# Patient Record
Sex: FEMALE | Race: WHITE | Employment: FULL TIME | URBAN - METROPOLITAN AREA
[De-identification: names, ages, dates, MRNs, and addresses within clinical notes are randomized per-mention and may not be internally consistent; named-entity substitution may affect disease eponyms.]

---

## 2017-04-19 ENCOUNTER — TELEPHONE (OUTPATIENT)
Dept: INTERNAL MEDICINE CLINIC | Facility: CLINIC | Age: 22
End: 2017-04-19

## 2017-04-19 NOTE — TELEPHONE ENCOUNTER
Pt is due for [facility] [place] [facility] wellness exam anytime this calendar year. Left message to call back.

## 2017-05-10 ENCOUNTER — TELEPHONE (OUTPATIENT)
Dept: INTERNAL MEDICINE CLINIC | Facility: CLINIC | Age: 22
End: 2017-05-10

## 2017-05-10 NOTE — TELEPHONE ENCOUNTER
Pt is due for [facility] [place] [facility] wellness exam anytime this calendar year. Left message to call back [phone].

## 2017-07-17 ENCOUNTER — TELEPHONE (OUTPATIENT)
Dept: FAMILY MEDICINE CLINIC | Facility: CLINIC | Age: 22
End: 2017-07-17

## 2017-07-17 NOTE — TELEPHONE ENCOUNTER
Pt is due for [facility]CHI[facility] [place] [facility] wellness exam anytime this calendar year. Left message to call back [phone].

## 2018-07-09 ENCOUNTER — OFFICE VISIT (OUTPATIENT)
Dept: OBGYN CLINIC | Facility: CLINIC | Age: 23
End: 2018-07-09

## 2018-07-09 VITALS
SYSTOLIC BLOOD PRESSURE: 103 MMHG | DIASTOLIC BLOOD PRESSURE: 71 MMHG | BODY MASS INDEX: 26 KG/M2 | HEART RATE: 84 BPM | WEIGHT: 154.19 LBS

## 2018-07-09 DIAGNOSIS — Z01.419 WOMEN'S ANNUAL ROUTINE GYNECOLOGICAL EXAMINATION: Primary | ICD-10-CM

## 2018-07-09 DIAGNOSIS — Z23 NEED FOR VACCINATION: ICD-10-CM

## 2018-07-09 DIAGNOSIS — Z11.3 ROUTINE SCREENING FOR STI (SEXUALLY TRANSMITTED INFECTION): ICD-10-CM

## 2018-07-09 DIAGNOSIS — E04.1 RIGHT THYROID NODULE: ICD-10-CM

## 2018-07-09 DIAGNOSIS — N89.8 VAGINAL ITCHING: ICD-10-CM

## 2018-07-09 PROCEDURE — 90651 9VHPV VACCINE 2/3 DOSE IM: CPT | Performed by: ADVANCED PRACTICE MIDWIFE

## 2018-07-09 PROCEDURE — 90471 IMMUNIZATION ADMIN: CPT | Performed by: ADVANCED PRACTICE MIDWIFE

## 2018-07-09 PROCEDURE — 99385 PREV VISIT NEW AGE 18-39: CPT | Performed by: ADVANCED PRACTICE MIDWIFE

## 2018-07-09 PROCEDURE — 99212 OFFICE O/P EST SF 10 MIN: CPT | Performed by: ADVANCED PRACTICE MIDWIFE

## 2018-07-09 NOTE — PROGRESS NOTES
HPI:    Patient ID: Mireya Aguilar is a 24year old female. Here for an annual exam.  Is thinking about making a change in contraception. Wants something non-hormonal.  Had a little vaginal itching for 2 days last week.   Works as a hair and make-up art Constitutional: She is oriented to person, place, and time. She appears well-developed and well-nourished. No distress. HENT:   Head: Normocephalic and atraumatic.    Right Ear: External ear normal.   Left Ear: External ear normal.   Nose: Nose normal. note and vitals reviewed. ASSESSMENT/PLAN:   Need for vaccination  Vaginal itching  Women's annual routine gynecological examination  (primary encounter diagnosis)  Routine screening for sti (sexually transmitted infection)    1.   Discussed die

## 2018-07-10 ENCOUNTER — TELEPHONE (OUTPATIENT)
Dept: OBGYN CLINIC | Facility: CLINIC | Age: 23
End: 2018-07-10

## 2018-07-10 NOTE — TELEPHONE ENCOUNTER
----- Message from ABIDA Leija sent at 7/10/2018 10:39 AM CDT -----  Has a BV infection needs Flagyl 500 mg BID x 7 days no alcohol for 10 days

## 2018-07-11 LAB
GENITAL VAGINOSIS SCREEN: POSITIVE
TRICHOMONAS SCREEN: NEGATIVE

## 2018-07-12 ENCOUNTER — TELEPHONE (OUTPATIENT)
Dept: OBGYN CLINIC | Facility: CLINIC | Age: 23
End: 2018-07-12

## 2018-07-12 NOTE — TELEPHONE ENCOUNTER
[phone] ([place])   Pt informed of vaginal screening results, Flagyl tx and no drinking for 10 days. Verbalized understanding. No further question.

## 2018-07-12 NOTE — TELEPHONE ENCOUNTER
----- Message from ABIDA Han sent at 7/11/2018  3:05 PM CDT -----  Vaginal culture negative for yeast

## 2018-08-21 ENCOUNTER — TELEPHONE (OUTPATIENT)
Dept: OBGYN CLINIC | Facility: CLINIC | Age: 23
End: 2018-08-21

## 2018-08-23 NOTE — TELEPHONE ENCOUNTER
Message left on pt's voicemail that she still has a overdue TSH from July. Pt can call [phone] with any questions or concerns.

## 2018-09-24 ENCOUNTER — TELEPHONE (OUTPATIENT)
Dept: OBGYN CLINIC | Facility: CLINIC | Age: 23
End: 2018-09-24

## 2020-02-10 ENCOUNTER — OFFICE VISIT (OUTPATIENT)
Dept: FAMILY MEDICINE CLINIC | Facility: CLINIC | Age: 25
End: 2020-02-10
Payer: COMMERCIAL

## 2020-02-10 VITALS
WEIGHT: 165 LBS | OXYGEN SATURATION: 99 % | BODY MASS INDEX: 29.23 KG/M2 | TEMPERATURE: 102 F | HEIGHT: 63 IN | RESPIRATION RATE: 18 BRPM | DIASTOLIC BLOOD PRESSURE: 68 MMHG | HEART RATE: 103 BPM | SYSTOLIC BLOOD PRESSURE: 112 MMHG

## 2020-02-10 DIAGNOSIS — J10.1 INFLUENZA A: Primary | ICD-10-CM

## 2020-02-10 DIAGNOSIS — R68.89 FLU-LIKE SYMPTOMS: ICD-10-CM

## 2020-02-10 LAB
POCT INFLUENZA A: POSITIVE
POCT INFLUENZA B: NEGATIVE

## 2020-02-10 PROCEDURE — 99213 OFFICE O/P EST LOW 20 MIN: CPT | Performed by: PHYSICIAN ASSISTANT

## 2020-02-10 PROCEDURE — 87502 INFLUENZA DNA AMP PROBE: CPT | Performed by: PHYSICIAN ASSISTANT

## 2020-02-10 NOTE — PROGRESS NOTES
CHIEF COMPLAINT:     Patient presents with:  Cough: dry cough, body ahces and chills x  1 dy travled back from [place]       HPI:   Ryan Rae is a 31year old female who presents for cold symptoms for since last night .  Had direct flight from sinuses, no frontal sinus tenderness  EYES: conjunctiva clear, EOM intact  EARS: TM's no erythema, bulging, or retraction bilaterally, no fluid, bony landmarks intact  NOSE: nostrils patent, turbinates with mild swelling, clear nasal mucous, nasal mucosa p symptoms worsen or persist within 2-3 days as discussed, follow up if worsening symptoms, uncontrolled fevers, cough, etc, patient understands. Patient understands and agrees with plan.      Malinda Glass PA-C  2/10/2020  1:41 PM

## 2021-03-01 ENCOUNTER — OFFICE VISIT (OUTPATIENT)
Dept: OBGYN CLINIC | Facility: CLINIC | Age: 26
End: 2021-03-01
Payer: COMMERCIAL

## 2021-03-01 VITALS — BODY MASS INDEX: 33 KG/M2 | WEIGHT: 184 LBS

## 2021-03-01 DIAGNOSIS — R10.32 ABDOMINAL PAIN, LEFT LOWER QUADRANT: ICD-10-CM

## 2021-03-01 DIAGNOSIS — Z01.419 WOMEN'S ANNUAL ROUTINE GYNECOLOGICAL EXAMINATION: Primary | ICD-10-CM

## 2021-03-01 DIAGNOSIS — Z11.3 SCREENING FOR STDS (SEXUALLY TRANSMITTED DISEASES): ICD-10-CM

## 2021-03-01 LAB
MULTISTIX LOT#: 5077 NUMERIC
PH, URINE: 5.5 (ref 4.5–8)
SPECIFIC GRAVITY: 1.02 (ref 1–1.03)
URINE-COLOR: YELLOW
UROBILINOGEN,SEMI-QN: 0.2 MG/DL (ref 0–1.9)

## 2021-03-01 PROCEDURE — 99395 PREV VISIT EST AGE 18-39: CPT | Performed by: ADVANCED PRACTICE MIDWIFE

## 2021-03-01 PROCEDURE — 81002 URINALYSIS NONAUTO W/O SCOPE: CPT | Performed by: ADVANCED PRACTICE MIDWIFE

## 2021-03-01 NOTE — PROGRESS NOTES
HPI:    Patient ID: Mildred Burt is a 31year old female. Here for annual exam.  Is a . Is sexually active. Is in a mutual monogamous relationship. Is using condoms for contraception.   Has not seen a provider for any reason in t • Heart Disorder Neg       Social History:   Social History    Tobacco Use      Smoking status: Former Smoker      Smokeless tobacco: Former User    Alcohol use:  Yes      Alcohol/week: 0.0 standard drinks    Drug use: No      Exercise: minimal.  Diet: do General: No tenderness or edema. Lymphadenopathy:     She has no cervical adenopathy. Right: No inguinal adenopathy present. Left: No inguinal adenopathy present. Neurological: She is alert and oriented to person, place, and time.    Skin:

## 2021-03-02 LAB
C TRACH DNA SPEC QL NAA+PROBE: NEGATIVE
N GONORRHOEA DNA SPEC QL NAA+PROBE: NEGATIVE

## 2021-03-04 LAB — LAST PAP RESULT: NORMAL

## 2021-03-16 ENCOUNTER — OFFICE VISIT (OUTPATIENT)
Dept: FAMILY MEDICINE CLINIC | Facility: CLINIC | Age: 26
End: 2021-03-16
Payer: COMMERCIAL

## 2021-03-16 VITALS
HEIGHT: 63 IN | SYSTOLIC BLOOD PRESSURE: 112 MMHG | WEIGHT: 187 LBS | BODY MASS INDEX: 33.13 KG/M2 | HEART RATE: 68 BPM | DIASTOLIC BLOOD PRESSURE: 79 MMHG

## 2021-03-16 DIAGNOSIS — S29.011A MUSCLE STRAIN OF CHEST WALL, INITIAL ENCOUNTER: ICD-10-CM

## 2021-03-16 DIAGNOSIS — Z00.00 WELL ADULT EXAM: Primary | ICD-10-CM

## 2021-03-16 PROCEDURE — 3008F BODY MASS INDEX DOCD: CPT | Performed by: NURSE PRACTITIONER

## 2021-03-16 PROCEDURE — 3074F SYST BP LT 130 MM HG: CPT | Performed by: NURSE PRACTITIONER

## 2021-03-16 PROCEDURE — 99203 OFFICE O/P NEW LOW 30 MIN: CPT | Performed by: NURSE PRACTITIONER

## 2021-03-16 PROCEDURE — 3078F DIAST BP <80 MM HG: CPT | Performed by: NURSE PRACTITIONER

## 2021-03-16 NOTE — ASSESSMENT & PLAN NOTE
Ice 20 min 4-6 times per day  ibuprofen 400 mg I po qid prn w food  Please call if symptoms worsen or are not resolving.

## 2021-03-16 NOTE — PROGRESS NOTES
HPI  Pt here for possible swollen lymph node to left mid rib cage. Symptoms started about a month ago-saw Seng Elizabeth for physical and advised to follow up if symptoms not improving. Tender at times-tries not to touch it. No known injury to area.  Rebekah Ellington Expenses:   Food Insecurity:       Worried About [employer] [employer] in the Last Year:       Ran Out of Food in the Last Year:   Transportation Needs:       Lack of Transportation (Medical):       Lack of Transportation (Non-Medical):   Physical Activity:

## 2021-04-13 ENCOUNTER — IMMUNIZATION (OUTPATIENT)
Dept: LAB | Facility: HOSPITAL | Age: 26
End: 2021-04-13
Attending: HOSPITALIST
Payer: COMMERCIAL

## 2021-04-13 DIAGNOSIS — Z23 NEED FOR VACCINATION: Primary | ICD-10-CM

## 2021-04-13 PROCEDURE — 0011A SARSCOV2 VAC 100MCG/0.5ML IM: CPT

## 2021-05-11 ENCOUNTER — IMMUNIZATION (OUTPATIENT)
Dept: LAB | Facility: HOSPITAL | Age: 26
End: 2021-05-11
Attending: EMERGENCY MEDICINE
Payer: COMMERCIAL

## 2021-05-11 DIAGNOSIS — Z23 NEED FOR VACCINATION: Primary | ICD-10-CM

## 2021-05-11 PROCEDURE — 0012A SARSCOV2 VAC 100MCG/0.5ML IM: CPT

## 2021-08-30 ENCOUNTER — OFFICE VISIT (OUTPATIENT)
Dept: INTERNAL MEDICINE CLINIC | Facility: CLINIC | Age: 26
End: 2021-08-30
Payer: COMMERCIAL

## 2021-08-30 VITALS
DIASTOLIC BLOOD PRESSURE: 76 MMHG | SYSTOLIC BLOOD PRESSURE: 113 MMHG | WEIGHT: 187 LBS | HEART RATE: 82 BPM | BODY MASS INDEX: 33.13 KG/M2 | HEIGHT: 63 IN

## 2021-08-30 DIAGNOSIS — F32.0 CURRENT MILD EPISODE OF MAJOR DEPRESSIVE DISORDER WITHOUT PRIOR EPISODE (HCC): ICD-10-CM

## 2021-08-30 DIAGNOSIS — Z00.00 PHYSICAL EXAM, ANNUAL: Primary | ICD-10-CM

## 2021-08-30 PROCEDURE — 3008F BODY MASS INDEX DOCD: CPT | Performed by: INTERNAL MEDICINE

## 2021-08-30 PROCEDURE — 99395 PREV VISIT EST AGE 18-39: CPT | Performed by: INTERNAL MEDICINE

## 2021-08-30 PROCEDURE — 3074F SYST BP LT 130 MM HG: CPT | Performed by: INTERNAL MEDICINE

## 2021-08-30 PROCEDURE — 3078F DIAST BP <80 MM HG: CPT | Performed by: INTERNAL MEDICINE

## 2021-08-30 NOTE — PROGRESS NOTES
HPI/Subjective:   Patient ID: Kenneth Bui is a 31year old female. Presents for physical exam    HPI  Patient reports that menstrual cycle at times irregular, can come 2 weeks later but more or less gets menstrual bleeding monthly.   During sae ill-appearing. HENT:      Head: Normocephalic and atraumatic. Eyes:      General: No scleral icterus. Extraocular Movements: Extraocular movements intact.       Conjunctiva/sclera: Conjunctivae normal.      Pupils: Pupils are equal, round, and react encounter       Imaging & Referrals:  None

## 2021-11-08 ENCOUNTER — TELEPHONE (OUTPATIENT)
Dept: OBGYN CLINIC | Facility: CLINIC | Age: 26
End: 2021-11-08

## 2021-11-08 ENCOUNTER — LAB ENCOUNTER (OUTPATIENT)
Dept: LAB | Facility: HOSPITAL | Age: 26
End: 2021-11-08
Attending: ADVANCED PRACTICE MIDWIFE
Payer: COMMERCIAL

## 2021-11-08 DIAGNOSIS — R45.86 MOOD CHANGES: ICD-10-CM

## 2021-11-08 DIAGNOSIS — Z13.21 ENCOUNTER FOR VITAMIN DEFICIENCY SCREENING: ICD-10-CM

## 2021-11-08 DIAGNOSIS — E04.9 THYROID ENLARGEMENT: ICD-10-CM

## 2021-11-08 PROCEDURE — 82306 VITAMIN D 25 HYDROXY: CPT

## 2021-11-08 PROCEDURE — 36415 COLL VENOUS BLD VENIPUNCTURE: CPT

## 2021-11-08 PROCEDURE — 84443 ASSAY THYROID STIM HORMONE: CPT

## 2021-11-08 RX ORDER — CHOLECALCIFEROL (VITAMIN D3) 1250 MCG
1 CAPSULE ORAL WEEKLY
Qty: 8 CAPSULE | Refills: 0 | Status: SHIPPED | OUTPATIENT
Start: 2021-11-08 | End: 2022-01-06

## 2021-11-08 NOTE — PROGRESS NOTES
Subjective:   Patient ID: [place] Arellano is a 31year old female. Here because she feels she could had a hormonal issue. Was seen for an annual gyne exam in March 2021. Saw Ankit Platt and Dr. Luli Ta. Multiple labs were ordered.   Was not v Prescriptions      No prescriptions requested or ordered in this encounter       Imaging & Referrals:  None

## 2021-11-23 ENCOUNTER — OFFICE VISIT (OUTPATIENT)
Dept: FAMILY MEDICINE CLINIC | Facility: CLINIC | Age: 26
End: 2021-11-23
Payer: COMMERCIAL

## 2021-11-23 ENCOUNTER — LAB ENCOUNTER (OUTPATIENT)
Dept: LAB | Facility: HOSPITAL | Age: 26
End: 2021-11-23
Attending: INTERNAL MEDICINE
Payer: COMMERCIAL

## 2021-11-23 VITALS
HEIGHT: 63 IN | DIASTOLIC BLOOD PRESSURE: 73 MMHG | WEIGHT: 190 LBS | BODY MASS INDEX: 33.66 KG/M2 | TEMPERATURE: 98 F | SYSTOLIC BLOOD PRESSURE: 111 MMHG | HEART RATE: 73 BPM

## 2021-11-23 DIAGNOSIS — Z00.00 WELL ADULT EXAM: ICD-10-CM

## 2021-11-23 DIAGNOSIS — F43.9 STRESS: ICD-10-CM

## 2021-11-23 DIAGNOSIS — Z00.00 PHYSICAL EXAM, ANNUAL: ICD-10-CM

## 2021-11-23 DIAGNOSIS — S29.011A INTERCOSTAL MUSCLE STRAIN, INITIAL ENCOUNTER: Primary | ICD-10-CM

## 2021-11-23 PROCEDURE — 3078F DIAST BP <80 MM HG: CPT | Performed by: FAMILY MEDICINE

## 2021-11-23 PROCEDURE — 3074F SYST BP LT 130 MM HG: CPT | Performed by: FAMILY MEDICINE

## 2021-11-23 PROCEDURE — 36415 COLL VENOUS BLD VENIPUNCTURE: CPT

## 2021-11-23 PROCEDURE — 82306 VITAMIN D 25 HYDROXY: CPT

## 2021-11-23 PROCEDURE — 85025 COMPLETE CBC W/AUTO DIFF WBC: CPT

## 2021-11-23 PROCEDURE — 80053 COMPREHEN METABOLIC PANEL: CPT

## 2021-11-23 PROCEDURE — 82607 VITAMIN B-12: CPT

## 2021-11-23 PROCEDURE — 83036 HEMOGLOBIN GLYCOSYLATED A1C: CPT

## 2021-11-23 PROCEDURE — 84443 ASSAY THYROID STIM HORMONE: CPT

## 2021-11-23 PROCEDURE — 99213 OFFICE O/P EST LOW 20 MIN: CPT | Performed by: FAMILY MEDICINE

## 2021-11-23 PROCEDURE — 3008F BODY MASS INDEX DOCD: CPT | Performed by: FAMILY MEDICINE

## 2021-11-23 PROCEDURE — 80061 LIPID PANEL: CPT

## 2021-11-23 NOTE — PROGRESS NOTES
HPI:   Kelley Burt is a 31year old female who presents for a complete physical exam.    Work: Works as a [employer].  Has been experiencing increased hand pain due to her repetitive work.   Continues to feel some soreness in her left lower rib 5' 3\" (1.6 m)   Wt 190 lb (86.2 kg)   LMP 11/20/2021 (Approximate)   BMI 33.66 kg/m²     GENERAL: well developed, well nourished, in no apparent distress  SKIN: no rashes, no suspicious lesions  HEENT: atraumatic, normocephalic, ears are clear  EYES: PERR

## 2021-11-26 ENCOUNTER — PATIENT MESSAGE (OUTPATIENT)
Dept: FAMILY MEDICINE CLINIC | Facility: CLINIC | Age: 26
End: 2021-11-26

## 2021-11-26 NOTE — TELEPHONE ENCOUNTER
From: Nieves Pittman  To: Stephanie Nayak MD  Sent: 11/26/2021 5:02 PM CST  Subject: Question regarding COMP METABOLIC PANEL (14)    Hi, I know you said to ask you about any of the lab results I received.  This was the only lab where I noticed I wa

## 2022-01-06 RX ORDER — CHOLECALCIFEROL (VITAMIN D3) 1250 MCG
1 CAPSULE ORAL WEEKLY
Qty: 8 CAPSULE | Refills: 0 | Status: SHIPPED | OUTPATIENT
Start: 2022-01-06 | End: 2022-02-25

## 2022-01-10 ENCOUNTER — IMMUNIZATION (OUTPATIENT)
Dept: LAB | Facility: HOSPITAL | Age: 27
End: 2022-01-10
Attending: EMERGENCY MEDICINE
Payer: COMMERCIAL

## 2022-01-10 DIAGNOSIS — Z23 NEED FOR VACCINATION: Primary | ICD-10-CM

## 2022-01-10 PROCEDURE — 0064A SARSCOV2 VAC 50MCG/0.25ML IM: CPT

## 2022-01-31 ENCOUNTER — NURSE TRIAGE (OUTPATIENT)
Dept: FAMILY MEDICINE CLINIC | Facility: CLINIC | Age: 27
End: 2022-01-31

## 2022-01-31 NOTE — TELEPHONE ENCOUNTER
----- Message from Stephen Walsh RN sent at 1/31/2022 12:05 PM CST -----  Regarding: FW: Skin concerns      ----- Message -----  From: Jace Baumgarten  Sent: 1/30/2022   8:48 PM CST  To: [facility] Triage  Subject: Skin concerns                                    Hi, I just wanted to reach out with a couple of small concerns. The first is about a mole on my arm. It looks kind of flaky, like there's dry skin over it. But it doesn't seem to be going away. Just wasn't sure if I should be concerned about it? Also, my skin has been really itchy this week. I think I'm getting small hives around my face and neck. Thinking maybe it's an allergic reaction but I'm not sure towards what. I've taken some allergy medication and wanted to make sure that was alright. Thanks!

## 2022-02-01 ENCOUNTER — TELEPHONE (OUTPATIENT)
Dept: INTERNAL MEDICINE CLINIC | Facility: CLINIC | Age: 27
End: 2022-02-01

## 2022-02-01 NOTE — TELEPHONE ENCOUNTER
Action Requested: Summary for Provider     []  Critical Lab, Recommendations Needed  [] Need Additional Advice  [x]   FYI    []   Need Orders  [] Need Medications Sent to Pharmacy  []  Other     SUMMARY:   Spoke with pt,  verified, pt has a mole on her left arm for a month or longer, it's dry and gray on the surface, flaky , little itchy. Pt denies pain, no changes in color and size, no discharge. Pt has PPO, req referral to see derm. Pt declined ov with PCP. Pt was informed since she has PPO, no referral is needed. Dem tel # provided, she stated understanding.          Reason for call: Acute  Onset: Data Unavailable                       Reason for Disposition  Ulysses Les is uncertain what it is    Protocols used: Dat Guevara 9463

## 2022-02-02 NOTE — TELEPHONE ENCOUNTER
Please advise patient that she can see any of her available doctors at the group Dr. Behar/Lance/Naya/Rahat/same number to call or she can look outside of the [place], see below

## 2022-02-02 NOTE — TELEPHONE ENCOUNTER
Dr. Amna Esteves,  Please call patient and provide another Physiatrist to see. The current physiatrist is booking out too far. Thank you.   Referral Department

## 2022-02-07 ENCOUNTER — OFFICE VISIT (OUTPATIENT)
Dept: DERMATOLOGY CLINIC | Facility: CLINIC | Age: 27
End: 2022-02-07
Payer: COMMERCIAL

## 2022-02-07 DIAGNOSIS — L30.9 DERMATITIS: Primary | ICD-10-CM

## 2022-02-07 DIAGNOSIS — D22.9 BENIGN MOLE: ICD-10-CM

## 2022-02-07 PROCEDURE — 99203 OFFICE O/P NEW LOW 30 MIN: CPT | Performed by: PHYSICIAN ASSISTANT

## 2022-06-19 NOTE — ASSESSMENT & PLAN NOTE
71 Exam completed by [facility]  Screening labs ordered as they were last done 2016

## 2022-10-18 ENCOUNTER — LAB ENCOUNTER (OUTPATIENT)
Dept: LAB | Facility: HOSPITAL | Age: 27
End: 2022-10-18
Attending: Other
Payer: COMMERCIAL

## 2022-10-18 DIAGNOSIS — Z00.00 ROUTINE ADULT HEALTH MAINTENANCE: ICD-10-CM

## 2022-10-18 LAB
FOLATE SERPL-MCNC: >20 NG/ML (ref 8.7–?)
T4 FREE SERPL-MCNC: 0.8 NG/DL (ref 0.8–1.7)
TSI SER-ACNC: 2.34 MIU/ML (ref 0.36–3.74)
VIT B12 SERPL-MCNC: 405 PG/ML (ref 193–986)
VIT D+METAB SERPL-MCNC: 28.5 NG/ML (ref 30–100)

## 2022-10-18 PROCEDURE — 82607 VITAMIN B-12: CPT

## 2022-10-18 PROCEDURE — 84443 ASSAY THYROID STIM HORMONE: CPT

## 2022-10-18 PROCEDURE — 36415 COLL VENOUS BLD VENIPUNCTURE: CPT

## 2022-10-18 PROCEDURE — 82306 VITAMIN D 25 HYDROXY: CPT

## 2022-10-18 PROCEDURE — 84439 ASSAY OF FREE THYROXINE: CPT

## 2022-10-18 PROCEDURE — 82746 ASSAY OF FOLIC ACID SERUM: CPT

## 2022-12-26 ENCOUNTER — OFFICE VISIT (OUTPATIENT)
Dept: FAMILY MEDICINE CLINIC | Facility: CLINIC | Age: 27
End: 2022-12-26
Payer: COMMERCIAL

## 2022-12-26 VITALS
TEMPERATURE: 98 F | SYSTOLIC BLOOD PRESSURE: 108 MMHG | HEIGHT: 63 IN | BODY MASS INDEX: 31.96 KG/M2 | RESPIRATION RATE: 16 BRPM | WEIGHT: 180.38 LBS | HEART RATE: 75 BPM | OXYGEN SATURATION: 99 % | DIASTOLIC BLOOD PRESSURE: 72 MMHG

## 2022-12-26 DIAGNOSIS — H66.91 ACUTE RIGHT OTITIS MEDIA: Primary | ICD-10-CM

## 2022-12-26 DIAGNOSIS — J06.9 VIRAL URI WITH COUGH: ICD-10-CM

## 2022-12-26 DIAGNOSIS — R06.2 DIFFUSE WHEEZING: ICD-10-CM

## 2022-12-26 RX ORDER — AMOXICILLIN 875 MG/1
875 TABLET, COATED ORAL 2 TIMES DAILY
Qty: 14 TABLET | Refills: 0 | Status: SHIPPED | OUTPATIENT
Start: 2022-12-26 | End: 2023-01-02

## 2022-12-26 RX ORDER — ALBUTEROL SULFATE 90 UG/1
2 AEROSOL, METERED RESPIRATORY (INHALATION) EVERY 4 HOURS PRN
Qty: 1 EACH | Refills: 0 | Status: SHIPPED | OUTPATIENT
Start: 2022-12-26

## 2023-04-06 ENCOUNTER — NURSE TRIAGE (OUTPATIENT)
Dept: FAMILY MEDICINE CLINIC | Facility: CLINIC | Age: 28
End: 2023-04-06

## 2023-04-06 ENCOUNTER — HOSPITAL ENCOUNTER (OUTPATIENT)
Dept: GENERAL RADIOLOGY | Age: 28
Discharge: HOME | End: 2023-04-06
Attending: NURSE PRACTITIONER
Payer: COMMERCIAL

## 2023-04-06 ENCOUNTER — OFFICE VISIT (OUTPATIENT)
Dept: INTERNAL MEDICINE CLINIC | Facility: CLINIC | Age: 28
End: 2023-04-06

## 2023-04-06 VITALS
BODY MASS INDEX: 32.6 KG/M2 | DIASTOLIC BLOOD PRESSURE: 84 MMHG | OXYGEN SATURATION: 99 % | HEIGHT: 63 IN | HEART RATE: 92 BPM | WEIGHT: 184 LBS | SYSTOLIC BLOOD PRESSURE: 126 MMHG

## 2023-04-06 DIAGNOSIS — R05.3 CHRONIC COUGH: Primary | ICD-10-CM

## 2023-04-06 DIAGNOSIS — R19.00 ABDOMINAL WALL SWELLING: ICD-10-CM

## 2023-04-06 DIAGNOSIS — R05.3 CHRONIC COUGH: ICD-10-CM

## 2023-04-06 PROCEDURE — 3074F SYST BP LT 130 MM HG: CPT | Performed by: NURSE PRACTITIONER

## 2023-04-06 PROCEDURE — 99213 OFFICE O/P EST LOW 20 MIN: CPT | Performed by: NURSE PRACTITIONER

## 2023-04-06 PROCEDURE — 3079F DIAST BP 80-89 MM HG: CPT | Performed by: NURSE PRACTITIONER

## 2023-04-06 PROCEDURE — 71046 X-RAY EXAM CHEST 2 VIEWS: CPT | Performed by: NURSE PRACTITIONER

## 2023-04-06 PROCEDURE — 3008F BODY MASS INDEX DOCD: CPT | Performed by: NURSE PRACTITIONER

## 2023-05-01 ENCOUNTER — HOSPITAL ENCOUNTER (OUTPATIENT)
Dept: CT IMAGING | Facility: HOSPITAL | Age: 28
Discharge: HOME | End: 2023-05-01
Attending: NURSE PRACTITIONER
Payer: COMMERCIAL

## 2023-05-01 DIAGNOSIS — R06.2 DIFFUSE WHEEZING: ICD-10-CM

## 2023-05-01 DIAGNOSIS — R19.00 ABDOMINAL WALL SWELLING: ICD-10-CM

## 2023-05-01 DIAGNOSIS — R19.00 ABDOMINAL WALL SWELLING: Primary | ICD-10-CM

## 2023-05-01 PROCEDURE — 74150 CT ABDOMEN W/O CONTRAST: CPT | Performed by: NURSE PRACTITIONER

## 2023-05-03 RX ORDER — ALBUTEROL SULFATE 90 UG/1
2 AEROSOL, METERED RESPIRATORY (INHALATION) EVERY 4 HOURS PRN
Qty: 1 EACH | Refills: 0 | Status: SHIPPED | OUTPATIENT
Start: 2023-05-03

## 2024-01-22 ENCOUNTER — LAB ENCOUNTER (OUTPATIENT)
Dept: LAB | Facility: HOSPITAL | Age: 29
End: 2024-01-22
Attending: Other
Payer: COMMERCIAL

## 2024-01-22 DIAGNOSIS — Z86.39 HISTORY OF VITAMIN D DEFICIENCY: ICD-10-CM

## 2024-01-22 LAB — VIT D+METAB SERPL-MCNC: 36.2 NG/ML (ref 30–100)

## 2024-01-22 PROCEDURE — 82306 VITAMIN D 25 HYDROXY: CPT

## 2024-01-22 PROCEDURE — 36415 COLL VENOUS BLD VENIPUNCTURE: CPT

## 2024-01-23 ENCOUNTER — OFFICE VISIT (OUTPATIENT)
Dept: FAMILY MEDICINE CLINIC | Facility: CLINIC | Age: 29
End: 2024-01-23
Payer: COMMERCIAL

## 2024-01-23 VITALS
BODY MASS INDEX: 31.18 KG/M2 | HEART RATE: 83 BPM | HEIGHT: 63 IN | OXYGEN SATURATION: 99 % | RESPIRATION RATE: 20 BRPM | TEMPERATURE: 98 F | WEIGHT: 176 LBS | SYSTOLIC BLOOD PRESSURE: 98 MMHG | DIASTOLIC BLOOD PRESSURE: 70 MMHG

## 2024-01-23 DIAGNOSIS — J06.9 VIRAL URI WITH COUGH: Primary | ICD-10-CM

## 2024-01-23 DIAGNOSIS — R06.2 WHEEZING: ICD-10-CM

## 2024-01-23 PROCEDURE — 99213 OFFICE O/P EST LOW 20 MIN: CPT | Performed by: NURSE PRACTITIONER

## 2024-01-23 PROCEDURE — 3078F DIAST BP <80 MM HG: CPT | Performed by: NURSE PRACTITIONER

## 2024-01-23 PROCEDURE — 3074F SYST BP LT 130 MM HG: CPT | Performed by: NURSE PRACTITIONER

## 2024-01-23 PROCEDURE — 3008F BODY MASS INDEX DOCD: CPT | Performed by: NURSE PRACTITIONER

## 2024-01-23 RX ORDER — PREDNISONE 20 MG/1
40 TABLET ORAL DAILY
Qty: 10 TABLET | Refills: 0 | Status: SHIPPED | OUTPATIENT
Start: 2024-01-23 | End: 2024-01-28

## 2024-01-23 RX ORDER — BENZONATATE 200 MG/1
200 CAPSULE ORAL 3 TIMES DAILY PRN
Qty: 30 CAPSULE | Refills: 0 | Status: SHIPPED | OUTPATIENT
Start: 2024-01-23

## 2024-01-23 NOTE — PROGRESS NOTES
CHIEF COMPLAINT:     Chief Complaint   Patient presents with    Cough     3 weeks w/ cough and sound raspy.          HPI:   Dior Chung is a 28 year old female who presents for cough for   2-3 weeks   Cough started with uri symptoms and gradually now tight and deep. This is similar to past episode.  Cough is somewhat productive and is worse at night when trying to sleep. Cough does not interfere with sleep once asleep.  Other triggers for the cough: pnd. Home treatments include: halls, tea, occasional albuterol.   Associated symptoms include: no fever, no chills, no sore throat, no sinus congestion, slight shortness of breath, + wheezing, yellowish mucous, ears itchy, no post-tussive emesis.         Current Outpatient Medications   Medication Sig Dispense Refill    predniSONE 20 MG Oral Tab Take 2 tablets (40 mg total) by mouth daily for 5 days. Take 2 tablets once a day with food.  Avoid taking at nighttime. 10 tablet 0    benzonatate 200 MG Oral Cap Take 1 capsule (200 mg total) by mouth 3 (three) times daily as needed for cough. 30 capsule 0    LORazepam 0.5 MG Oral Tab Take 1 tablet (0.5 mg total) by mouth 2 (two) times daily as needed for Anxiety. 30 tablet 1    sertraline (ZOLOFT) 100 MG Oral Tab Take 1.5 tablets (150 mg total) by mouth every morning. 135 tablet 1    albuterol (PROAIR HFA) 108 (90 Base) MCG/ACT Inhalation Aero Soln Inhale 2 puffs into the lungs every 4 (four) hours as needed for Wheezing or Shortness of Breath. 1 each 0      Past Medical History:   Diagnosis Date    Chronic streptococcal tonsillitis     VARICELLA 1998    VSD 02/04/95      Social History:  Social History     Socioeconomic History    Marital status: Single   Tobacco Use    Smoking status: Former    Smokeless tobacco: Never    Tobacco comments:     as a teenager   Vaping Use    Vaping Use: Never used   Substance and Sexual Activity    Alcohol use: Yes     Comment: 1 drink per week    Drug use: Yes     Types:  Cannabis     Comment: few times a week    Sexual activity: Yes     Partners: Male     Birth control/protection: Condom     Comment: 1 current partner        REVIEW OF SYSTEMS:   GENERAL: No fever or chills.  SKIN: No rashes, or other skin lesions.   EYES: Denies blurred vision or double vision.  HENT: Denies ear pain or decreased hearing.  See HPI  CARDIOVASCULAR: Denies chest pain or palpitations  LUNGS: Per HPI.   GI: Denies N/V/C/D or abdominal pain.    EXAM:   BP 98/70   Pulse 83   Temp 98.2 °F (36.8 °C)   Resp 20   Ht 5' 3\" (1.6 m)   Wt 176 lb (79.8 kg)   LMP 01/11/2024 (Exact Date)   SpO2 99%   BMI 31.18 kg/m²   GENERAL: well developed, well nourished,in no apparent distress  SKIN: no rashes, no suspicious lesions  EYES: Conjunctiva clear.  No scleral icterus.  HENT: Atraumatic, normocephalic. No sinus tenderness.  TM's clear, + fluid bilaterally.  Nostrils patent, nasal mucosa pink and non-inflamed.  No erythema of the throat.  NECK: supple, non-tender.  LUNGS: Normal respiratory rate. Normal effort.  Dry cough. No wheezing. No rales or crackles.  No decreased BS. No dullness on percussion of anterior and posterior chest wall.  Normal tactile fremitus. Normal egophony.  CARDIO: RRR without murmur  LYMPH: No cervical or supraclavicular lymphadenopathy.   EXTREMITIES:  No clubbing, cyanosis, or edema.    ASSESSMENT:   Dior Chung is a 28 year old female who presents with   Encounter Diagnoses   Name Primary?    Viral URI with cough Yes    Wheezing          PLAN:   Comfort care as listed in patient instructions.  Medications below.    Meds & Refills for this Visit:  Requested Prescriptions     Signed Prescriptions Disp Refills    predniSONE 20 MG Oral Tab 10 tablet 0     Sig: Take 2 tablets (40 mg total) by mouth daily for 5 days. Take 2 tablets once a day with food.  Avoid taking at nighttime.    benzonatate 200 MG Oral Cap 30 capsule 0     Sig: Take 1 capsule (200 mg total) by mouth 3  (three) times daily as needed for cough.       Risks, benefits, side effects of medication explained and discussed.   The patient is asked to follow-up if sx's persist or worsen.  The patient indicates understanding of these issues and agrees to the plan.    Patient Instructions       General comfort measures:  Get rest!  Hydrate! (cold or hot based on comfort). Drink lots of water or other non dehydrating liquids to help with illness. Salty foods, soups and tea can help with throat pain.   Hand washing-use hand  or wash hands frequently, cover your cough or sneeze, do not share towels or drinks with others.  Salt water gargles (1 tsp. Salt in 6 oz lukewarm water): gargle for 2 minutes, repeat every 15 minutes as needed to help decrease swelling and relieve pain.  Use humidified air, steamy showers/baths and use vaporizer in sleeping quarters to keep secretions thin.  Avoid smoking.    Symptom management:    Nasal congestion/Post-nasal-drip: Saline nasal spray to nostrils to help remove drainage or an antihistamine to help dry up drainage.    Sinus congestion/Post-nasal-drip: OTC Nasacort or Flonase (steroid nasal spray) nightly for 2 weeks. May take Sudafed (D) or Sudafed-PE, if not contraindicated (do not take if you have HTN).   Pain/discomfort:  May use Tylenol or Ibuprofen, if not contraindicated.  Cough:  May take DM-dextromethorophan over the counter (long lasting). Ex: Delsym  Chest congestion:  May take guaifenesin with a lot of water.  Ex:  Plain Mucinex.  Sore throat:  Cepacol lozenges or Chloroseptic throat spray (active ingredient Benzocaine).      Follow up with your PCP in 1-2 weeks if not better.  Follow up in a few days if worsening symptoms. Seek immediate care if inability to swallow or breathe.

## 2024-01-23 NOTE — PATIENT INSTRUCTIONS
General comfort measures:  Get rest!  Hydrate! (cold or hot based on comfort). Drink lots of water or other non dehydrating liquids to help with illness. Salty foods, soups and tea can help with throat pain.   Hand washing-use hand  or wash hands frequently, cover your cough or sneeze, do not share towels or drinks with others.  Salt water gargles (1 tsp. Salt in 6 oz lukewarm water): gargle for 2 minutes, repeat every 15 minutes as needed to help decrease swelling and relieve pain.  Use humidified air, steamy showers/baths and use vaporizer in sleeping quarters to keep secretions thin.  Avoid smoking.    Symptom management:    Nasal congestion/Post-nasal-drip: Saline nasal spray to nostrils to help remove drainage or an antihistamine to help dry up drainage.    Sinus congestion/Post-nasal-drip: OTC Nasacort or Flonase (steroid nasal spray) nightly for 2 weeks. May take Sudafed (D) or Sudafed-PE, if not contraindicated (do not take if you have HTN).   Pain/discomfort:  May use Tylenol or Ibuprofen, if not contraindicated.  Cough:  May take DM-dextromethorophan over the counter (long lasting). Ex: Delsym  Chest congestion:  May take guaifenesin with a lot of water.  Ex:  Plain Mucinex.  Sore throat:  Cepacol lozenges or Chloroseptic throat spray (active ingredient Benzocaine).      Follow up with your PCP in 1-2 weeks if not better.  Follow up in a few days if worsening symptoms. Seek immediate care if inability to swallow or breathe.

## 2024-03-11 ENCOUNTER — TELEPHONE (OUTPATIENT)
Dept: FAMILY MEDICINE CLINIC | Facility: CLINIC | Age: 29
End: 2024-03-11

## 2024-03-11 DIAGNOSIS — R06.2 DIFFUSE WHEEZING: ICD-10-CM

## 2024-03-12 RX ORDER — ALBUTEROL SULFATE 90 UG/1
2 AEROSOL, METERED RESPIRATORY (INHALATION) EVERY 4 HOURS PRN
Qty: 1 EACH | Refills: 0 | Status: SHIPPED | OUTPATIENT
Start: 2024-03-12

## 2024-03-12 NOTE — TELEPHONE ENCOUNTER
Please review; protocol failed/ has no protocol    Message sent for patient to make an appointment.     Requested Prescriptions   Pending Prescriptions Disp Refills    albuterol (PROAIR HFA) 108 (90 Base) MCG/ACT Inhalation Aero Soln 1 each 0     Sig: Inhale 2 puffs into the lungs every 4 (four) hours as needed for Wheezing or Shortness of Breath.       Asthma & COPD Medication Protocol Failed - 3/11/2024 11:10 AM        Failed - Asthma Action Score greater than or equal to 20        Failed - Appointment in past 6 or next 3 months      Recent Outpatient Visits              1 month ago [facility] [place]    Office Visit    11 months ago Chronic cough    [facility], [address], [place] Joana Borges APRN    Office Visit    1 year ago Acute right otitis media    [facility], [place]    Office Visit    2 years ago Dermatitis    [facility], Eliel Crain PA-C    Office Visit    2 years ago Intercostal muscle strain, initial encounter    [facility], Arlin Bell MD    Office Visit                      Failed - AAP/ACT given in last 12 months     No data recorded  No data recorded  No data recorded  No data recorded             Recent Outpatient Visits              1 month ago Viral URI with cough    [facility], Cely Webster APRN    Office Visit    11 months ago Chronic cough    [facility], [address], [place] Joana Borges APRN    Office Visit    1 year ago Acute right otitis media    [facility], [place]    Office Visit    2 years ago Dermatitis    [facility],  Vickey Giles, Eliel Crain PA-C    Office Visit    2 years ago Intercostal muscle strain, initial encounter    [facility], [facility], Arlin Bell MD    Office Visit

## 2024-04-15 ENCOUNTER — OFFICE VISIT (OUTPATIENT)
Dept: FAMILY MEDICINE CLINIC | Facility: CLINIC | Age: 29
End: 2024-04-15
Payer: COMMERCIAL

## 2024-04-15 VITALS
WEIGHT: 176.19 LBS | BODY MASS INDEX: 31.22 KG/M2 | SYSTOLIC BLOOD PRESSURE: 107 MMHG | DIASTOLIC BLOOD PRESSURE: 76 MMHG | HEIGHT: 63 IN | HEART RATE: 80 BPM

## 2024-04-15 DIAGNOSIS — R06.02 SHORTNESS OF BREATH: ICD-10-CM

## 2024-04-15 DIAGNOSIS — R05.3 CHRONIC COUGH: ICD-10-CM

## 2024-04-15 DIAGNOSIS — R06.2 WHEEZING: Primary | ICD-10-CM

## 2024-04-15 DIAGNOSIS — D22.9 CHANGE IN MOLE: ICD-10-CM

## 2024-04-15 PROBLEM — Z00.00 WELL ADULT EXAM: Status: RESOLVED | Noted: 2021-03-16 | Resolved: 2024-04-15

## 2024-04-15 PROBLEM — S29.011A CHEST WALL MUSCLE STRAIN: Status: RESOLVED | Noted: 2021-03-16 | Resolved: 2024-04-15

## 2024-04-15 PROCEDURE — 99214 OFFICE O/P EST MOD 30 MIN: CPT | Performed by: FAMILY MEDICINE

## 2024-04-15 PROCEDURE — 3074F SYST BP LT 130 MM HG: CPT | Performed by: FAMILY MEDICINE

## 2024-04-15 PROCEDURE — 3078F DIAST BP <80 MM HG: CPT | Performed by: FAMILY MEDICINE

## 2024-04-15 PROCEDURE — 3008F BODY MASS INDEX DOCD: CPT | Performed by: FAMILY MEDICINE

## 2024-04-15 RX ORDER — MONTELUKAST SODIUM 10 MG/1
10 TABLET ORAL DAILY
Qty: 90 TABLET | Refills: 3 | Status: SHIPPED | OUTPATIENT
Start: 2024-04-15 | End: 2025-04-10

## 2024-04-15 NOTE — PROGRESS NOTES
Chief Complaint   Patient presents with    Wheezing    Ear Problem     Irritation      HPI:   Dior Chung is a 29 year old female who presents to clinic with complaints of cough, wheezing over the past few months.  Has been having respiratory issues over the past year where she gets winded and her chest gets tight.  Difficult to pinpoint aggravating or relieving factors.  Not necessarily worse at night or with food consumption.  No recent fevers, chills, sore throat or sinus congestion.  Occasionally itchy ears.    Tried antihistamines previously and did not help with symptoms.REVIEW OF SYSTEMS:   Negative, except per HPI.     EXAM:   /76 (BP Location: Left arm, Patient Position: Sitting, Cuff Size: adult)   Pulse 80   Ht 5' 3\" (1.6 m)   Wt 176 lb 3.2 oz (79.9 kg)   LMP 01/11/2024 (Exact Date)   BMI 31.21 kg/m²   Body mass index is 31.21 kg/m².  GENERAL: well developed, well nourished, in no apparent distress  SKIN: no rashes, no suspicious lesions  HEENT: atraumatic, normocephalic  EYES: PERRLA, EOMI,conjunctiva are clear  NECK: supple, no adenopathy  LUNGS: + Mild wheezing in the left posterior lung field  CARDIO: RRR without murmur    ASSESSMENT AND PLAN:     1. Wheezing  - Complete PFT w/Bronchodilator/Albuterol 2.5; Future  - Pulmonary Referral - In [facility]  - montelukast (SINGULAIR) 10 MG Oral Tab; Take 1 tablet (10 mg total) by mouth daily.  Dispense: 90 tablet; Refill: 3    2. Change in mole  - DERM - INTERNAL    3. Chronic cough  - Complete PFT w/Bronchodilator/Albuterol 2.5; Future  - Pulmonary Referral - In [facility]  - montelukast (SINGULAIR) 10 MG Oral Tab; Take 1 tablet (10 mg total) by mouth daily.  Dispense: 90 tablet; Refill: 3    4. Shortness of breath  - Complete PFT w/Bronchodilator/Albuterol 2.5; Future  - Pulmonary Referral - In [facility]  - montelukast (SINGULAIR) 10 MG Oral Tab; Take 1 tablet (10 mg total) by mouth daily.  Dispense: 90 tablet; Refill: 3     RTC if no  improvement in symptoms. Red flags discussed to go to ER.     Arlin Lamb MD  4/15/2024  3:58 PM

## 2024-09-24 ENCOUNTER — OFFICE VISIT (OUTPATIENT)
Dept: PULMONOLOGY | Facility: CLINIC | Age: 29
End: 2024-09-24
Payer: COMMERCIAL

## 2024-09-24 VITALS
HEIGHT: 63 IN | HEART RATE: 85 BPM | OXYGEN SATURATION: 98 % | SYSTOLIC BLOOD PRESSURE: 104 MMHG | BODY MASS INDEX: 32.43 KG/M2 | RESPIRATION RATE: 14 BRPM | DIASTOLIC BLOOD PRESSURE: 69 MMHG | WEIGHT: 183 LBS

## 2024-09-24 DIAGNOSIS — R05.3 CHRONIC COUGH: ICD-10-CM

## 2024-09-24 DIAGNOSIS — Z82.49 FAMILY HISTORY OF PULMONARY EMBOLISM: ICD-10-CM

## 2024-09-24 DIAGNOSIS — R06.2 WHEEZING: Primary | ICD-10-CM

## 2024-09-24 PROCEDURE — 99244 OFF/OP CNSLTJ NEW/EST MOD 40: CPT | Performed by: PHYSICIAN ASSISTANT

## 2024-09-24 PROCEDURE — 3008F BODY MASS INDEX DOCD: CPT | Performed by: PHYSICIAN ASSISTANT

## 2024-09-24 PROCEDURE — 3078F DIAST BP <80 MM HG: CPT | Performed by: PHYSICIAN ASSISTANT

## 2024-09-24 PROCEDURE — 3074F SYST BP LT 130 MM HG: CPT | Performed by: PHYSICIAN ASSISTANT

## 2024-09-24 RX ORDER — ALBUTEROL SULFATE 90 UG/1
2 INHALANT RESPIRATORY (INHALATION) EVERY 6 HOURS PRN
Qty: 1 EACH | Refills: 0 | Status: SHIPPED | OUTPATIENT
Start: 2024-09-24

## 2024-09-24 NOTE — PROGRESS NOTES
Pulmonary Consult Note    History of Present Illness:  Dior Chung is a 29 year old female presenting to pulmonary clinic today for wheezing, referred by PCP Dr. Lamb. Patient describes intermittent wheezing, chest tightness, cough, and dyspnea, which initially started few years ago when she was outside in the cold. She has since had same symptoms in response to cold weather and when she gets sick with cold-like symptoms. She had COVID-19 in Dec 2023 and has lingering cough, wheezing, and chest congestion described as a \"rattle\" for several months. She feels well at present with exception of occasional cough. Has occasional phlegm (clear/yellow). No hemoptysis. Has postnasal drainage with throat clearing. Has infrequent acid reflux. She has used albuterol as needed in the past with improvement in chest tightness and wheezing but no change in cough. She uses albuterol 1x/week at most. No nocturnal symptoms. No prior ER or UC visits for these symptoms. No known history of lung disease or venous thromboembolism. No history of asthma. No pets at home. She had smoked socially for a few years in late teens. There is family history of recurrent unprovoked VTE in her father (no prior thrombophilia testing) who had first event at age 34 as well as in paternal uncle and paternal grandfather. She had chest x-ray April 2024 which was unremarkable. PFTs ordered but not yet done.     Past Medical History: Thyroid nodule, generalized anxiety disorder    Past Surgical History: Tonsillectomy, adenoidectomy, wisdom teeth    Family Medical History: Mother alive and well, father alive with DVT/PE, paternal uncle and paternal grandfather with history of VTE    Social History: Engaged, no kids, hairdresser at [employer]  -Tobacco: Former smoker, quit 10 years ago after having smoked socially (few cigarettes/week) for 4 years  -Vaping: None  -Alcohol: None  -Pets: None  -Exposures: Dyes/bleach as hairdresser    Allergies: Patient has no known  allergies.     Medications: Sertraline, lorazepam, albuterol    Review of Systems:   Constitutional: No fever or chills. No weight loss or weight gain.  HEENT: +Postnasal drainage. No vision changes.   Cardio: No chest pain or palpitations. No pleurisy.  Respiratory: See HPI.  GI: +Infrequent acid reflux.  Extremities: No lower extremity swelling or pain.   Neurologic: No headache.  Skin: No rash.  Psych: +Anxiety. No depression.     Physical Exam:  /69   Pulse 85   Resp 14   Ht 5' 3\" (1.6 m)   Wt 183 lb (83 kg)   LMP 01/11/2024 (Exact Date)   SpO2 98%   BMI 32.42 kg/m²    Constitutional: No acute distress.  HEENT: Head NC/AT. PEERL. No tonsillar or uvula enlargement.  Cardio: Regular rate and rhythm. Normal S1 and S2. No murmurs.   Respiratory: Thorax symmetrical with no labored breathing. Clear to ausculation bilaterally with symmetrical breath sounds. No wheezing, rhonchi, or crackles.   Extremities: No clubbing or cyanosis. No LE edema. No calf tenderness.  Neurologic: A&Ox3. No gross motor deficits.  Skin: Warm, dry.  Lymphatic: No cervical or supraclavicular lymphadenopathy.  Psych: Calm, cooperative. Pleasant affect.    Results:  Chest x-ray 4/6/2024: Normal    Assessment/Plan:  Wheezing, cough, chest tightness  ?Possible asthma. Etiologies of wheezing and cough discussed including asthma, GERD, allergic rhinitis with postnasal drainage. No prior PFTs. Chest  x-ray normal. Albuterol helps. Triggered by cold weather and upper respiratory infections. No prior maintenance inhaler therapy.   Plan:  -PFTs  -Albuterol MDI as needed  -Further recommendations to follow results of PFTs    Family history of DVT/PE  1st degree relative with recurrent unprovoked VTE at age young age with unknown thrombophilia status as well as 2nd degree relatives with VTE. No personal history of VTE. STEVEN guidelines suggest not routinely testing for thrombophilia in this situation, however, could be considered in certain  cases. Briefly discussed risks vs benefits with patient. Offered referral to hematology to determine benefit/necessity of testing.  Plan:  -Information for hematology provided if patient interested in thrombophilia testing    Chandler Cornell PA-C  Pulmonary Medicine  9/24/2024

## 2024-09-30 ENCOUNTER — HOSPITAL ENCOUNTER (OUTPATIENT)
Dept: RESPIRATORY THERAPY | Facility: HOSPITAL | Age: 29
Discharge: HOME | End: 2024-09-30
Attending: FAMILY MEDICINE
Payer: COMMERCIAL

## 2024-09-30 DIAGNOSIS — R06.2 WHEEZING: ICD-10-CM

## 2024-09-30 DIAGNOSIS — R05.3 CHRONIC COUGH: ICD-10-CM

## 2024-09-30 DIAGNOSIS — R06.2 WHEEZING: Primary | ICD-10-CM

## 2024-09-30 DIAGNOSIS — R06.02 SHORTNESS OF BREATH: ICD-10-CM

## 2024-09-30 PROCEDURE — 94060 EVALUATION OF WHEEZING: CPT | Performed by: INTERNAL MEDICINE

## 2024-09-30 PROCEDURE — 94729 DIFFUSING CAPACITY: CPT | Performed by: INTERNAL MEDICINE

## 2024-09-30 PROCEDURE — 94726 PLETHYSMOGRAPHY LUNG VOLUMES: CPT | Performed by: INTERNAL MEDICINE

## 2024-09-30 NOTE — PROCEDURES
[facility]  part of formerly [facility]    PFT Interpretation    Dior Chung     1995 MRN [id number]   Height  63 inh  Age 29 year old   Weight  183 lbs  Sex Female         Spirometry:   Normal   FEV1 2.99 L which is 103%  FVC 3.72 L which is 110%  FEV1/FVC ratio 80%    No significant bronchodilator response      FVL:   Normal      Lung Volume:   Normal   TLC 5.17 L which is 104%  VC 3.74 L which is 97%  RV/TLC ratio is normal      DLCO:   99% which is normal      Impression:   Normal PFTs  No significant bronchodilator response        Thank you for allowing me to participate in the care of your patient.    Elizabeth Vivas MD  2024  2:57 PM

## 2024-10-07 ENCOUNTER — APPOINTMENT (OUTPATIENT)
Dept: URBAN - METROPOLITAN AREA CLINIC 244 | Age: 29
Setting detail: DERMATOLOGY
End: 2024-10-07

## 2024-10-07 DIAGNOSIS — L81.4 OTHER MELANIN HYPERPIGMENTATION: ICD-10-CM

## 2024-10-07 DIAGNOSIS — D22 MELANOCYTIC NEVI: ICD-10-CM

## 2024-10-07 PROBLEM — D22.62 MELANOCYTIC NEVI OF LEFT UPPER LIMB, INCLUDING SHOULDER: Status: ACTIVE | Noted: 2024-10-07

## 2024-10-07 PROBLEM — D22.9 MELANOCYTIC NEVI, UNSPECIFIED: Status: ACTIVE | Noted: 2024-10-07

## 2024-10-07 PROBLEM — D22.21 MELANOCYTIC NEVI OF RIGHT EAR AND EXTERNAL AURICULAR CANAL: Status: ACTIVE | Noted: 2024-10-07

## 2024-10-07 PROCEDURE — OTHER BIOPSY BY SHAVE METHOD: OTHER

## 2024-10-07 PROCEDURE — 69100 BIOPSY OF EXTERNAL EAR: CPT

## 2024-10-07 PROCEDURE — OTHER MIPS QUALITY: OTHER

## 2024-10-07 PROCEDURE — 99203 OFFICE O/P NEW LOW 30 MIN: CPT | Mod: 25

## 2024-10-07 PROCEDURE — OTHER COUNSELING: OTHER

## 2024-10-07 PROCEDURE — 11102 TANGNTL BX SKIN SINGLE LES: CPT | Mod: 59

## 2024-10-07 ASSESSMENT — LOCATION DETAILED DESCRIPTION DERM
LOCATION DETAILED: LEFT ANTERIOR PROXIMAL UPPER ARM
LOCATION DETAILED: RIGHT INFERIOR POSTERIOR HELIX

## 2024-10-07 ASSESSMENT — LOCATION SIMPLE DESCRIPTION DERM
LOCATION SIMPLE: LEFT UPPER ARM
LOCATION SIMPLE: RIGHT EAR

## 2024-10-07 ASSESSMENT — LOCATION ZONE DERM
LOCATION ZONE: ARM
LOCATION ZONE: EAR

## 2024-10-07 NOTE — PROCEDURE: BIOPSY BY SHAVE METHOD
Additional Anesthesia Volume In Cc (Will Not Render If 0): 0
Consent: Written consent was obtained and risks were reviewed including but not limited to scarring, infection, bleeding, scabbing, incomplete removal, nerve damage and allergy to anesthesia. The scar produced with a shave biopsy is often suboptimal and appears atrophic and white. Patient understands this and prefers shave biopsy instead of a punch biopsy.\\n\\nPatient aware that I cannot guarantee cosmetic outcome of biopsy and biopsy is done for medical purposes (not cosmetic).\\n\\nPatient aware that sometimes biopsy results can be inconclusive and that another biopsy or procedure may be required.
Hide Topical Anesthesia?: No
Lab: -[id number]
Biopsy Type: H and E
Post-Care Instructions: Reviewed with the patient in detail post-care instructions. Patient is to keep the area dry for 48 hours, and not to engage in any swimming until the area is healed. If patient does have water exposure, recommend waterproof (hydrocolloid dressing) application. Should the patient develop any fevers, chills, bleeding, severe pain, then the patient will contact the office immediately.\\n\\n** The patient was explicitly instructed to NOT apply any other topicals to the area other than plain vaseline with a clean Q-tip **. No Neosporin or topical Ab unless prescribed/recommended by the Dr is to be used.\\n\\nThe patient is aware to have the area re-evaluated if it is not healed
Depth Of Biopsy: dermis
Notification Instructions: Patient will be notified of biopsy results. However, patient instructed to call the office if not contacted within 2 weeks.
Cryotherapy Text: The wound bed was treated with cryotherapy after the biopsy was performed.
Anesthesia Volume In Cc: 0.5
Wound Care: Petrolatum
Curettage Text: The wound bed was treated with curettage after the biopsy was performed.
Type Of Destruction Used: Curettage
Dressing: bandage
Biopsy Method: double edge Personna blade
Was A Bandage Applied: Yes
Electrodesiccation Text: The wound bed was treated with electrodesiccation after the biopsy was performed.
Billing Type: Third-Party Bill
Information: Selecting Yes will display possible errors in your note based on the variables you have selected. This validation is only offered as a suggestion for you. PLEASE NOTE THAT THE VALIDATION TEXT WILL BE REMOVED WHEN YOU FINALIZE YOUR NOTE. IF YOU WANT TO FAX A PRELIMINARY NOTE YOU WILL NEED TO TOGGLE THIS TO 'NO' IF YOU DO NOT WANT IT IN YOUR FAXED NOTE.
Anesthesia Type: 0.5% lidocaine with 1:200,000 epinephrine and a 1:10 solution of 8.4% sodium bicarbonate
Hemostasis: Drysol
Electrodesiccation And Curettage Text: The wound bed was treated with electrodesiccation and curettage after the biopsy was performed.
Silver Nitrate Text: The wound bed was treated with silver nitrate after the biopsy was performed.
Detail Level: Detailed
Biopsy Method: Dermablade
Anesthesia Type: 1% lidocaine with epinephrine
Consent: Written consent was obtained and risks were reviewed including but not limited to scarring, infection, bleeding, scabbing, incomplete removal, nerve damage and allergy to anesthesia.
Post-Care Instructions: I reviewed with the patient in detail post-care instructions. Patient is to keep the biopsy site dry overnight, and then apply bacitracin twice daily until healed. Patient may apply hydrogen peroxide soaks to remove any crusting.

## 2024-10-08 ENCOUNTER — TELEPHONE (OUTPATIENT)
Dept: PULMONOLOGY | Facility: CLINIC | Age: 29
End: 2024-10-08

## 2024-10-08 NOTE — TELEPHONE ENCOUNTER
----- Message from Chandler Cornell sent at 10/8/2024 10:27 AM CDT -----  RN: Please call patient with PFT results as she has not read [employer] message.    Your pulmonary function testing is normal. I would recommend an additional test called bronchoprovocation testing to further evaluate for possibility of asthma. It is similar to the pulmonary function testing but you will given a medication via inhaler that can cause airway sensitivity in people with asthma. You can call [phone] to schedule.

## 2024-10-22 NOTE — TELEPHONE ENCOUNTER
Attempted to contact patient without success. No response letter sent to patient via [employer] and mail.

## (undated) NOTE — LETTER
11/9/2021              [address]        [address]         Dear Sudhakar Villasenor,    It was a pleasure to see you. Your Vitamin D was LOW.  A Vitamin D supplement to be taken once a week for 8 weeks was sent paul

## (undated) NOTE — LETTER
10/18/21        [facility]  [address]  [address] [place] [address]      Dear Jt Cowart records indicate that you have outstanding lab work and or testing that was ordered for you and has not yet been completed:  Orders Placed This Encoun

## (undated) NOTE — Clinical Note
10/22/2024          Dior Chung    [address]    [place] [address]         Dear Dior,    Our records indicate that the tests ordered for you by Chandler Cornell PA-C  have not been done.  If you have, in fact, already completed the tests or you do not wish to have the tests done, please contact our office at THE NUMBER LISTED BELOW.  Otherwise, please proceed with the testing.  Enclosed is a duplicate order for your convenience.        Sincerely,    Chandler Cornell PA-C  [facility], [facility], [place]  [address]  [place] [address]  [phone]        Document electronically generated by:  Sarai LANDERS RN

## (undated) NOTE — LETTER
Date: 2/10/2020    Patient Name: Celeste Santamaria          To Whom it may concern: This letter has been written at the patient's request. The above patient was seen at the [facility] for treatment of a medical condition.     This bjorn

## (undated) NOTE — LETTER
07/15/21        Nedra Chung  [address]  [address]      Dear Mecca Marlow records indicate that you have outstanding lab work and or testing that was ordered for you and has not yet been completed:  Orders Placed This Encoun

## (undated) NOTE — MR AVS SNAPSHOT
After Visit Summary   3/1/2021    Nieves Pittman    MRN: [id number]           Visit Information     Date & Time  3/1/2021 11:30 AM Provider  [address] [facility]  Dept.  Phone  [phone] Injury & Illness are never convenient. If you are dealing with a   non-emergency, consider your options before heading to an ER.   VIDEO VISITS  Visit face-to-face with a [facility] physician or   TOMY using your mobile device or computer   using [employer].    [employer]

## (undated) NOTE — LETTER
08/26/21        [address]  [address]      Dear Sneha Peng records indicate that you have outstanding lab work and or testing that was ordered for you and has not yet been completed:  Orders Placed This Encoun

## (undated) NOTE — LETTER
10/22/2024          Dior Chung        [address]        [place] [address]         Dear Dior,    This letter is to inform you that our office has made several attempts to reach you by phone without success.  We were attempting to contact you by phone regarding test results and an order.     Please contact our office at the number listed below as soon as you receive this letter to discuss this issue and to make the necessary changes in our system to your contact information.  Thank you for your cooperation.        Sincerely,    Chandler Cornell PA-C  [facility], [facility], [place]  [address]  [place] [address]  [phone]